# Patient Record
Sex: FEMALE | Race: WHITE | NOT HISPANIC OR LATINO | Employment: FULL TIME | URBAN - METROPOLITAN AREA
[De-identification: names, ages, dates, MRNs, and addresses within clinical notes are randomized per-mention and may not be internally consistent; named-entity substitution may affect disease eponyms.]

---

## 2018-06-07 ENCOUNTER — HOSPITAL ENCOUNTER (EMERGENCY)
Facility: HOSPITAL | Age: 38
Discharge: HOME | End: 2018-06-07
Payer: COMMERCIAL

## 2018-06-07 VITALS
SYSTOLIC BLOOD PRESSURE: 126 MMHG | RESPIRATION RATE: 18 BRPM | OXYGEN SATURATION: 100 % | HEART RATE: 68 BPM | WEIGHT: 185.63 LBS | TEMPERATURE: 98 F | DIASTOLIC BLOOD PRESSURE: 78 MMHG

## 2018-06-07 DIAGNOSIS — N39.0 URINARY TRACT INFECTION WITHOUT HEMATURIA, SITE UNSPECIFIED: Primary | ICD-10-CM

## 2018-06-07 DIAGNOSIS — R10.9 BILATERAL FLANK PAIN: ICD-10-CM

## 2018-06-07 LAB
ALBUMIN SERPL BCP-MCNC: 3.9 G/DL
ALP SERPL-CCNC: 66 U/L
ALT SERPL W/O P-5'-P-CCNC: 36 U/L
ANION GAP SERPL CALC-SCNC: 9 MMOL/L
AST SERPL-CCNC: 25 U/L
B-HCG UR QL: NEGATIVE
BACTERIA #/AREA URNS HPF: ABNORMAL /HPF
BASOPHILS # BLD AUTO: 0.03 K/UL
BASOPHILS NFR BLD: 0.3 %
BILIRUB SERPL-MCNC: 0.5 MG/DL
BILIRUB UR QL STRIP: ABNORMAL
BUN SERPL-MCNC: 16 MG/DL
CALCIUM SERPL-MCNC: 10.3 MG/DL
CHLORIDE SERPL-SCNC: 110 MMOL/L
CLARITY UR: ABNORMAL
CO2 SERPL-SCNC: 23 MMOL/L
COLOR UR: ABNORMAL
CREAT SERPL-MCNC: 1 MG/DL
DIFFERENTIAL METHOD: ABNORMAL
EOSINOPHIL # BLD AUTO: 0.3 K/UL
EOSINOPHIL NFR BLD: 3.1 %
ERYTHROCYTE [DISTWIDTH] IN BLOOD BY AUTOMATED COUNT: 13.3 %
EST. GFR  (AFRICAN AMERICAN): >60 ML/MIN/1.73 M^2
EST. GFR  (NON AFRICAN AMERICAN): >60 ML/MIN/1.73 M^2
GLUCOSE SERPL-MCNC: 93 MG/DL
GLUCOSE UR QL STRIP: ABNORMAL
HCT VFR BLD AUTO: 42.7 %
HGB BLD-MCNC: 14.6 G/DL
HGB UR QL STRIP: ABNORMAL
KETONES UR QL STRIP: ABNORMAL
LEUKOCYTE ESTERASE UR QL STRIP: ABNORMAL
LYMPHOCYTES # BLD AUTO: 1.8 K/UL
LYMPHOCYTES NFR BLD: 20.4 %
MCH RBC QN AUTO: 31.5 PG
MCHC RBC AUTO-ENTMCNC: 34.2 G/DL
MCV RBC AUTO: 92 FL
MICROSCOPIC COMMENT: ABNORMAL
MONOCYTES # BLD AUTO: 1 K/UL
MONOCYTES NFR BLD: 10.9 %
NEUTROPHILS # BLD AUTO: 5.7 K/UL
NEUTROPHILS NFR BLD: 65.3 %
NITRITE UR QL STRIP: ABNORMAL
PH UR STRIP: ABNORMAL [PH] (ref 5–8)
PLATELET # BLD AUTO: 302 K/UL
PMV BLD AUTO: 9.7 FL
POTASSIUM SERPL-SCNC: 4 MMOL/L
PROT SERPL-MCNC: 7.4 G/DL
PROT UR QL STRIP: ABNORMAL
RBC # BLD AUTO: 4.63 M/UL
SODIUM SERPL-SCNC: 142 MMOL/L
SP GR UR STRIP: ABNORMAL (ref 1–1.03)
SQUAMOUS #/AREA URNS HPF: 20 /HPF
URN SPEC COLLECT METH UR: ABNORMAL
UROBILINOGEN UR STRIP-ACNC: ABNORMAL EU/DL
WBC # BLD AUTO: 8.72 K/UL
WBC #/AREA URNS HPF: 10 /HPF (ref 0–5)
YEAST URNS QL MICRO: ABNORMAL

## 2018-06-07 PROCEDURE — 85025 COMPLETE CBC W/AUTO DIFF WBC: CPT

## 2018-06-07 PROCEDURE — 99284 EMERGENCY DEPT VISIT MOD MDM: CPT | Mod: 25

## 2018-06-07 PROCEDURE — 80053 COMPREHEN METABOLIC PANEL: CPT

## 2018-06-07 PROCEDURE — 96374 THER/PROPH/DIAG INJ IV PUSH: CPT

## 2018-06-07 PROCEDURE — 81025 URINE PREGNANCY TEST: CPT

## 2018-06-07 PROCEDURE — 81000 URINALYSIS NONAUTO W/SCOPE: CPT

## 2018-06-07 PROCEDURE — 63600175 PHARM REV CODE 636 W HCPCS: Performed by: PHYSICIAN ASSISTANT

## 2018-06-07 RX ORDER — PHENAZOPYRIDINE HYDROCHLORIDE 100 MG/1
100 TABLET, FILM COATED ORAL 3 TIMES DAILY PRN
COMMUNITY
End: 2020-01-01

## 2018-06-07 RX ORDER — EPINEPHRINE 0.15 MG/.3ML
0.15 INJECTION INTRAMUSCULAR
COMMUNITY

## 2018-06-07 RX ORDER — KETOROLAC TROMETHAMINE 30 MG/ML
10 INJECTION, SOLUTION INTRAMUSCULAR; INTRAVENOUS
Status: COMPLETED | OUTPATIENT
Start: 2018-06-07 | End: 2018-06-07

## 2018-06-07 RX ORDER — NITROFURANTOIN 25; 75 MG/1; MG/1
100 CAPSULE ORAL
COMMUNITY
End: 2020-01-01

## 2018-06-07 RX ORDER — MONTELUKAST SODIUM 10 MG/1
10 TABLET ORAL NIGHTLY
COMMUNITY

## 2018-06-07 RX ORDER — CITALOPRAM 40 MG/1
40 TABLET, FILM COATED ORAL DAILY
COMMUNITY

## 2018-06-07 RX ADMIN — KETOROLAC TROMETHAMINE 10 MG: 30 INJECTION, SOLUTION INTRAMUSCULAR; INTRAVENOUS at 07:06

## 2018-06-07 NOTE — ED PROVIDER NOTES
SCRIBE #1 NOTE: I, Nurys Alarcon, am scribing for, and in the presence of, RAY Bangura. I have scribed the entire note.      History      Chief Complaint   Patient presents with    Flank Pain     recently diagnosed with UTI, taking antibiotics; walk-in clinic sent her to ED for CT scan       Review of patient's allergies indicates:   Allergen Reactions    Wellbutrin [bupropion hcl] Hives        HPI   HPI    2018, 3:28 PM   History obtained from the patient      History of Present Illness: Antonia Elliott is a 38 y.o. female patient with PMHx of anxiety presents to the Emergency Department for flank pain which onset gradually yesterday. Symptoms are intermittent and moderate in severity.  Pt describes sxs as sharp/stabbing pain. Pain is located to bilateral flanks. Pt reports that her pain worsened today. Pt reports that she has been experiencing UTI sxs for the past week.  She states that she went to a walk in clinic yesterday and she is still waiting on culture results. Pt reports that she has been taking abx for UTI since her visit. Pt reports that she spoke to her doctor today and was referred to ED for CT. No mitigating or exacerbating factors reported. Associated sxs include frequency and dysuria. Patient denies any fever, nausea, vomiting, diarrhea, hematuria, vaginal discharge, pelvic pain, urinary urgency, decreased urine output, chills, SOB, and all other sxs at this time. No further complaints or concerns at this time.     Arrival mode: Personal vehicle    PCP: CYNTHIA Ruiz       Past Medical History:  Past Medical History:   Diagnosis Date    Anxiety     Lipoma     Miscarriage        Past Surgical History:  Past Surgical History:   Procedure Laterality Date    BLADDER SUSPENSION       SECTION      x2    LIPOMA RESECTION Left     Breast     PARTIAL HYSTERECTOMY           Family History:  No family history on file.    Social History:  Social History     Social  History Main Topics    Smoking status: Current Some Day Smoker    Smokeless tobacco: Never Used    Alcohol use No    Drug use: No    Sexual activity: Yes       ROS   Review of Systems   Constitutional: Negative for chills and fever.   HENT: Negative for sore throat.    Respiratory: Negative for shortness of breath.    Cardiovascular: Negative for chest pain.   Gastrointestinal: Negative for abdominal pain, diarrhea, nausea and vomiting.   Genitourinary: Positive for dysuria, flank pain (bilateral) and frequency. Negative for decreased urine volume, difficulty urinating, hematuria, pelvic pain, urgency and vaginal discharge.   Musculoskeletal: Negative for back pain.   Skin: Negative for rash.   Neurological: Negative for weakness.   Hematological: Does not bruise/bleed easily.       Physical Exam      Initial Vitals [06/07/18 1514]   BP Pulse Resp Temp SpO2   (!) 159/84 81 18 98.3 °F (36.8 °C) (!) 94 %      MAP       109          Physical Exam  Nursing Notes and Vital Signs Reviewed.  Constitutional: Patient is in no apparent distress. Well-developed and well-nourished.  Head: Atraumatic. Normocephalic.  Eyes: PERRL. EOM intact. Conjunctivae are not pale. No scleral icterus.  ENT: Mucous membranes are moist. Oropharynx is clear and symmetric.    Neck: Supple. Full ROM. No lymphadenopathy.  Cardiovascular: Regular rate. Regular rhythm. No murmurs, rubs, or gallops. Distal pulses are 2+ and symmetric.  Pulmonary/Chest: No respiratory distress. Clear to auscultation bilaterally. No wheezing or rales.  Abdominal: Soft and non-distended.  There is no tenderness.  No rebound, guarding, or rigidity. Good bowel sounds.  Genitourinary: No CVA tenderness  Musculoskeletal: Moves all extremities. No obvious deformities. No edema. No calf tenderness.  Skin: Warm and dry.  Neurological:  Alert, awake, and appropriate.  Normal speech.  No acute focal neurological deficits are appreciated.  Psychiatric: Normal affect. Good eye  contact. Appropriate in content.    ED Course    Procedures  ED Vital Signs:  Vitals:    06/07/18 1514   BP: (!) 159/84   Pulse: 81   Resp: 18   Temp: 98.3 °F (36.8 °C)   TempSrc: Oral   SpO2: (!) 94%   Weight: 84.2 kg (185 lb 10 oz)       Abnormal Lab Results:  Labs Reviewed   URINALYSIS - Abnormal; Notable for the following:        Result Value    Color, UA Orange (*)     Appearance, UA Hazy (*)     All other components within normal limits   CBC W/ AUTO DIFFERENTIAL - Abnormal; Notable for the following:     MCH 31.5 (*)     All other components within normal limits   URINALYSIS MICROSCOPIC - Abnormal; Notable for the following:     WBC, UA 10 (*)     Bacteria, UA Moderate (*)     All other components within normal limits   PREGNANCY TEST, URINE RAPID   COMPREHENSIVE METABOLIC PANEL        All Lab Results:  Results for orders placed or performed during the hospital encounter of 06/07/18   Pregnancy, urine rapid   Result Value Ref Range    Preg Test, Ur Negative    Urinalysis   Result Value Ref Range    Specimen UA Urine, Clean Catch     Color, UA Orange (A) Yellow, Straw, Muna    Appearance, UA Hazy (A) Clear    pH, UA SEE COMMENT 5.0 - 8.0    Specific Gravity, UA SEE COMMENT 1.005 - 1.030    Protein, UA SEE COMMENT Negative    Glucose, UA SEE COMMENT Negative    Ketones, UA SEE COMMENT Negative    Bilirubin (UA) SEE COMMENT Negative    Occult Blood UA SEE COMMENT Negative    Nitrite, UA SEE COMMENT Negative    Urobilinogen, UA SEE COMMENT <2.0 EU/dL    Leukocytes, UA SEE COMMENT Negative   CBC auto differential   Result Value Ref Range    WBC 8.72 3.90 - 12.70 K/uL    RBC 4.63 4.00 - 5.40 M/uL    Hemoglobin 14.6 12.0 - 16.0 g/dL    Hematocrit 42.7 37.0 - 48.5 %    MCV 92 82 - 98 fL    MCH 31.5 (H) 27.0 - 31.0 pg    MCHC 34.2 32.0 - 36.0 g/dL    RDW 13.3 11.5 - 14.5 %    Platelets 302 150 - 350 K/uL    MPV 9.7 9.2 - 12.9 fL    Gran # (ANC) 5.7 1.8 - 7.7 K/uL    Lymph # 1.8 1.0 - 4.8 K/uL    Mono # 1.0 0.3 - 1.0  K/uL    Eos # 0.3 0.0 - 0.5 K/uL    Baso # 0.03 0.00 - 0.20 K/uL    Gran% 65.3 38.0 - 73.0 %    Lymph% 20.4 18.0 - 48.0 %    Mono% 10.9 4.0 - 15.0 %    Eosinophil% 3.1 0.0 - 8.0 %    Basophil% 0.3 0.0 - 1.9 %    Differential Method Automated    Comprehensive metabolic panel   Result Value Ref Range    Sodium 142 136 - 145 mmol/L    Potassium 4.0 3.5 - 5.1 mmol/L    Chloride 110 95 - 110 mmol/L    CO2 23 23 - 29 mmol/L    Glucose 93 70 - 110 mg/dL    BUN, Bld 16 6 - 20 mg/dL    Creatinine 1.0 0.5 - 1.4 mg/dL    Calcium 10.3 8.7 - 10.5 mg/dL    Total Protein 7.4 6.0 - 8.4 g/dL    Albumin 3.9 3.5 - 5.2 g/dL    Total Bilirubin 0.5 0.1 - 1.0 mg/dL    Alkaline Phosphatase 66 55 - 135 U/L    AST 25 10 - 40 U/L    ALT 36 10 - 44 U/L    Anion Gap 9 8 - 16 mmol/L    eGFR if African American >60 >60 mL/min/1.73 m^2    eGFR if non African American >60 >60 mL/min/1.73 m^2   Urinalysis Microscopic   Result Value Ref Range    WBC, UA 10 (H) 0 - 5 /hpf    Bacteria, UA Moderate (A) None-Occ /hpf    Yeast, UA None None    Squam Epithel, UA 20 /hpf    Microscopic Comment SEE COMMENT          Imaging Results:  Imaging Results          CT Renal Stone Study ABD Pelvis WO (Final result)  Result time 06/07/18 18:18:38    Final result by Taz Rincon MD (06/07/18 18:18:38)                 Impression:      No acute findings.    Asymmetrically enlarged right ovary.  Pelvic ultrasound recommended.    Left renal nephrolithiasis.    All CT scans at this facility use dose modulation, iterative reconstruction and/or weight based dosing when appropriate to reduce radiation dose to as low as reasonably achievable.      Electronically signed by: Taz Rincon MD  Date:    06/07/2018  Time:    18:18             Narrative:    EXAMINATION:  CT RENAL STONE STUDY ABD PELVIS WO    CLINICAL HISTORY:  Flank pain, stone disease suspected;    TECHNIQUE:  Axial images obtained of the abdomen and pelvis without IV contrast and without oral contrast.  Sagittal  and coronal reformats obtained.    COMPARISON:  None    FINDINGS:  ABDOMEN:    - Lung bases: Lungs bases are clear.    - Liver: No contour deformities.    - Gallbladder: No calcified gallstones.    - Bile Ducts: No evidence of intra or extra hepatic biliary ductal dilation.    - Spleen: No contour deformities.    - Kidneys: Punctate calculus lower pole left kidney.  Negative for hydronephrosis or ureteral calculus.    - Adrenals: Normal adrenals.    - Pancreas: No contour deformities.    - Bowel: The bowel is nonobstructed and without surrounding inflammatory changes.  Normal appendix.    - Retroperitoneum:  Unremarkable.    - Vascular: No abdominal aortic aneurysm.    - Abdominal wall:  Unremarkable.    PELVIS:    - Bladder: Normal.    - : Hysterectomy.  Asymmetrically enlarged right ovary.    BONES:  No acute osseous abnormality and no significant arthritic changes.                                        The Emergency Provider reviewed the vital signs and test results, which are outlined above.    ED Discussion       6:30 PM: Reassessed pt at this time. Pt is in no distress. Discussed with pt all pertinent ED information and results. Discussed pt diagnosis and plan of treatment. Gave pt all f/u and return to the ED instructions. All questions and concerns were addressed at this time. Pt expresses understanding of information and instructions, and is comfortable with plan to discharge. Pt is stable for discharge.    I discussed with patient and/or family/caretaker that evaluation in the ED does not suggest any emergent or life threatening medical conditions requiring immediate intervention beyond what was provided in the ED, and I believe patient is safe for discharge.  Regardless, an unremarkable evaluation in the ED does not preclude the development or presence of a serious of life threatening condition. As such, patient was instructed to return immediately for any worsening or change in current  symptoms.        ED Medication(s):  Medications   ketorolac injection 9.999 mg (not administered)       New Prescriptions    No medications on file       Follow-up Information     CYNTHIA Ruiz. Schedule an appointment as soon as possible for a visit in 2 days.    Specialty:  Family Medicine  Why:  Follow up with your primary care physician in the next 1-2 days for re-evaluation and further management. Continue medications as prescribed.  Contact information:  [address]  [address]  [facility]  [facility] [address]  [phone]             [facility] - .    Specialty:  Emergency Medicine  Why:  If symptoms worsen in any way.  Contact information:  [address]  [facility] [address] [phone]                   Medical Decision Making    Medical Decision Making:   History:   Old Medical Records: I decided to obtain old medical records.  Old Records Summarized: records from clinic visits.       <> Summary of Records: UA result from [facility] clinic visit yesterday:    POCT urinalysis dipstick (06/06/2018 9:15 AM)    Component Value Ref Range  Glucose, Urine POCT 100 (ONE HUNDRED) (A) NEGATIVE mg/dl  BILIRUBIN URINE,POC SMALL (A) NEGATIVE  KETONES URINE, POC TRACE (A) NEGATIVE mg/dl  SPECIFIC GRAVITY,POC 1.005 1.005 - 1.030  BLOOD URINE, POC SMALL (A) NEGATIVE  PH UR,POC 5.0 4.5 - 8.0  PROTEIN UR,  (ONE HUNDRED) (A) NEGATIVE mg/dl  UROBILINOGEN UR, POC 4.0 (FOUR) (A) 0.2 (TWO TENTHS), 1.0 (ONE) E.U/dl  NITRITE UR POC POSITIVE (A) NEGATIVE  LEUKOCYTE EST UR, POC LARGE (A) NEGATIVE  APPEARANCE FL,POC red, slightly cloudy    Lot Number ,[id number]    Expiration,POC 01/31/19    , POC siemens                    Scribe Attestation:   Scribe #1: I performed the above scribed service and the documentation accurately describes the services I performed. I attest to the accuracy of the note.    Attending:   Physician Attestation  Statement for Scribe #1: I, RAY Bangura, personally performed the services described in this documentation, as scribed by Nurys Alarcon, in my presence, and it is both accurate and complete.          Clinical Impression       ICD-10-CM ICD-9-CM   1. Urinary tract infection without hematuria, site unspecified N39.0 599.0   2. Bilateral flank pain R10.9 789.09       Disposition:   Disposition: Discharged  Condition: Stable           Piero Otero PA-C  06/07/18 1299

## 2020-01-01 ENCOUNTER — HOSPITAL ENCOUNTER (EMERGENCY)
Facility: HOSPITAL | Age: 40
Discharge: HOME | End: 2020-01-01
Attending: EMERGENCY MEDICINE
Payer: COMMERCIAL

## 2020-01-01 VITALS
TEMPERATURE: 98 F | RESPIRATION RATE: 20 BRPM | SYSTOLIC BLOOD PRESSURE: 122 MMHG | DIASTOLIC BLOOD PRESSURE: 78 MMHG | HEART RATE: 76 BPM | HEIGHT: 62 IN | WEIGHT: 187.19 LBS | OXYGEN SATURATION: 99 % | BODY MASS INDEX: 34.45 KG/M2

## 2020-01-01 DIAGNOSIS — R05.9 COUGH: ICD-10-CM

## 2020-01-01 DIAGNOSIS — H65.06 RECURRENT ACUTE SEROUS OTITIS MEDIA OF BOTH EARS: Primary | ICD-10-CM

## 2020-01-01 PROCEDURE — 96372 THER/PROPH/DIAG INJ SC/IM: CPT

## 2020-01-01 PROCEDURE — 99284 EMERGENCY DEPT VISIT MOD MDM: CPT | Mod: 25

## 2020-01-01 PROCEDURE — 63600175 PHARM REV CODE 636 W HCPCS: Performed by: EMERGENCY MEDICINE

## 2020-01-01 RX ORDER — DEXAMETHASONE SODIUM PHOSPHATE 4 MG/ML
8 INJECTION, SOLUTION INTRA-ARTICULAR; INTRALESIONAL; INTRAMUSCULAR; INTRAVENOUS; SOFT TISSUE
Status: COMPLETED | OUTPATIENT
Start: 2020-01-01 | End: 2020-01-01

## 2020-01-01 RX ADMIN — DEXAMETHASONE SODIUM PHOSPHATE 8 MG: 4 INJECTION, SOLUTION INTRA-ARTICULAR; INTRALESIONAL; INTRAMUSCULAR; INTRAVENOUS; SOFT TISSUE at 02:01

## 2020-01-01 NOTE — ED PROVIDER NOTES
SCRIBE #1 NOTE: I, Andrew Garcia, am scribing for, and in the presence of, Hernandez Hylton MD. I have scribed the entire note.      History      Chief Complaint   Patient presents with    Otalgia     seen at urgent care on Fri and was told she had fluid in her ears pt reports lightheaded     Cough     was dx with strep throat on Fri, now has unproductive cough       Review of patient's allergies indicates:   Allergen Reactions    Mold Anaphylaxis    Wasp venom Itching and Swelling    Wellbutrin [bupropion hcl] Hives        HPI   HPI    2020, 2:12 PM   History obtained from the patient      History of Present Illness: Antonia Elliott is a 39 y.o. female patient who presents to the Emergency Department for bilateral ear pain, onset 1 week PTA. Pt presented to the urgent care 5 days ago and was told she had fluid in her ears. Pt was also diagnosed with strep throat the same day, and placed on Amoxicillin. Pt tested negative for the flu. Symptoms are constant and moderate in severity. No mitigating or exacerbating factors reported. Associated sxs include cough and dizziness when bending forward. Patient denies any fever, chills, n/v/d, SOB, CP, weakness, numbness, and all other sxs at this time. No further complaints or concerns at this time.     Arrival mode: Personal vehicle    PCP: CYNTHIA Ruiz       Past Medical History:  Past Medical History:   Diagnosis Date    Anxiety     Lipoma     Miscarriage        Past Surgical History:  Past Surgical History:   Procedure Laterality Date    BLADDER SUSPENSION       SECTION      x2    LIPOMA RESECTION Left     Breast     PARTIAL HYSTERECTOMY           Family History:  No family history on file.    Social History:  Social History     Tobacco Use    Smoking status: Current Some Day Smoker    Smokeless tobacco: Never Used   Substance and Sexual Activity    Alcohol use: No    Drug use: No    Sexual activity: Yes       ROS   Review of Systems    Constitutional: Negative for chills, diaphoresis, fatigue and fever.   HENT: Positive for ear pain (bilateral). Negative for sore throat.    Respiratory: Positive for cough. Negative for shortness of breath.    Cardiovascular: Negative for chest pain.   Gastrointestinal: Negative for diarrhea, nausea and vomiting.   Genitourinary: Negative for dysuria.   Musculoskeletal: Negative for back pain.   Skin: Negative for rash and wound.   Neurological: Negative for dizziness, weakness, light-headedness, numbness and headaches.   Hematological: Does not bruise/bleed easily.   All other systems reviewed and are negative.    Physical Exam      Initial Vitals [01/01/20 1406]   BP Pulse Resp Temp SpO2   128/82 77 18 98.7 °F (37.1 °C) 97 %      MAP       --          Physical Exam  Nursing Notes and Vital Signs Reviewed.  Constitutional: Patient is in no acute distress. Well-developed and well-nourished.  Head: Atraumatic. Normocephalic.  Eyes: PERRL. EOM intact. Conjunctivae are not pale. No scleral icterus.  ENT: Mucous membranes are moist. Oropharynx is clear and symmetric. Nasal congestion. Bilateral TM bulging, no erythema.  Neck: Supple. Full ROM. No lymphadenopathy.  Cardiovascular: Regular rate. Regular rhythm. No murmurs, rubs, or gallops. Distal pulses are 2+ and symmetric.  Pulmonary/Chest: No respiratory distress. Clear to auscultation bilaterally. No wheezing or rales.  Abdominal: Soft and non-distended.  There is no tenderness.  No rebound, guarding, or rigidity.   Musculoskeletal: Moves all extremities. No obvious deformities. No edema.  Skin: Warm and dry.  Neurological:  Alert, awake, and appropriate.  Normal speech.  No acute focal neurological deficits are appreciated.  Psychiatric: Normal affect. Good eye contact. Appropriate in content.    ED Course    Procedures  ED Vital Signs:  Vitals:    01/01/20 1406   BP: 128/82   Pulse: 77   Resp: 18   Temp: 98.7 °F (37.1 °C)   TempSrc: Oral   SpO2: 97%   Weight:  "84.9 kg (187 lb 2.7 oz)   Height: 5' 2" (1.575 m)       Imaging Results:  Imaging Results          X-Ray Chest PA And Lateral (Final result)  Result time 01/01/20 14:32:03    Final result by Salvatore Finney MD (01/01/20 14:32:03)                 Impression:      No acute findings.      Electronically signed by: Salvatore Finney MD  Date:    01/01/2020  Time:    14:32             Narrative:    EXAMINATION:  XR CHEST PA AND LATERAL    CLINICAL HISTORY  Cough and congestion., Cough;    COMPARISON:  None    FINDINGS:  The heart size is normal.  The lung fields are clear.  No acute cardiopulmonary infiltrate.  Old right clavicle fracture.                                        The Emergency Provider reviewed the vital signs and test results, which are outlined above.    ED Discussion     2:37 PM: Reassessed pt at this time. Discussed with pt all pertinent ED information and results. Discussed pt dx and plan of tx. Gave pt all f/u and return to the ED instructions. All questions and concerns were addressed at this time. Pt expresses understanding of information and instructions, and is comfortable with plan to discharge. Pt is stable for discharge.    I discussed with patient and/or family/caretaker that evaluation in the ED does not suggest any emergent or life threatening medical conditions requiring immediate intervention beyond what was provided in the ED, and I believe patient is safe for discharge.  Regardless, an unremarkable evaluation in the ED does not preclude the development or presence of a serious of life threatening condition. As such, patient was instructed to return immediately for any worsening or change in current symptoms.    ED Medication(s):  Medications   dexamethasone injection 8 mg (has no administration in time range)       Follow-up Information     CYNTHIA Ruiz.    Specialty:  Family Medicine  Contact information:  [address]  [address]  [facility]  [facility] [address]  [phone]                  New Prescriptions    No medications on file         Medical Decision Making    Medical Decision Making:   Clinical Tests:   Radiological Study: Ordered and Reviewed           Scribe Attestation:   Scribe #1: I performed the above scribed service and the documentation accurately describes the services I performed. I attest to the accuracy of the note.    Attending:   Physician Attestation Statement for Scribe #1: I, Hernandez Hylton MD, personally performed the services described in this documentation, as scribed by Andrew Garcia, in my presence, and it is both accurate and complete.          Clinical Impression       ICD-10-CM ICD-9-CM   1. Recurrent acute serous otitis media of both ears H65.06 381.01   2. Cough R05 786.2       Disposition:   Disposition: Discharged  Condition: Stable         Hernandez Hylton MD  01/01/20 2780

## 2020-04-21 DIAGNOSIS — Z01.84 ANTIBODY RESPONSE EXAMINATION: ICD-10-CM

## 2020-04-23 ENCOUNTER — LAB VISIT (OUTPATIENT)
Dept: LAB | Facility: HOSPITAL | Age: 40
End: 2020-04-23
Attending: INTERNAL MEDICINE
Payer: COMMERCIAL

## 2020-04-23 DIAGNOSIS — Z01.84 ANTIBODY RESPONSE EXAMINATION: ICD-10-CM

## 2020-04-23 LAB — SARS-COV-2 IGG SERPL QL IA: NEGATIVE

## 2020-04-23 PROCEDURE — 36415 COLL VENOUS BLD VENIPUNCTURE: CPT

## 2020-04-23 PROCEDURE — 86769 SARS-COV-2 COVID-19 ANTIBODY: CPT

## 2020-08-03 ENCOUNTER — OFFICE VISIT (OUTPATIENT)
Dept: INTERNAL MEDICINE | Facility: CLINIC | Age: 40
End: 2020-08-03
Payer: COMMERCIAL

## 2020-08-03 ENCOUNTER — LAB VISIT (OUTPATIENT)
Dept: INTERNAL MEDICINE | Facility: CLINIC | Age: 40
End: 2020-08-03
Payer: COMMERCIAL

## 2020-08-03 VITALS
HEART RATE: 65 BPM | SYSTOLIC BLOOD PRESSURE: 118 MMHG | BODY MASS INDEX: 34.12 KG/M2 | DIASTOLIC BLOOD PRESSURE: 76 MMHG | TEMPERATURE: 97 F | WEIGHT: 185.44 LBS | OXYGEN SATURATION: 99 % | HEIGHT: 62 IN

## 2020-08-03 DIAGNOSIS — R68.83 CHILLS: Primary | ICD-10-CM

## 2020-08-03 DIAGNOSIS — R09.81 SINUS CONGESTION: ICD-10-CM

## 2020-08-03 DIAGNOSIS — R68.83 CHILLS: ICD-10-CM

## 2020-08-03 PROCEDURE — 99214 PR OFFICE/OUTPT VISIT, EST, LEVL IV, 30-39 MIN: ICD-10-PCS | Mod: S$GLB,,, | Performed by: FAMILY MEDICINE

## 2020-08-03 PROCEDURE — 99999 PR PBB SHADOW E&M-EST. PATIENT-LVL III: ICD-10-PCS | Mod: PBBFAC,,, | Performed by: FAMILY MEDICINE

## 2020-08-03 PROCEDURE — 99999 PR PBB SHADOW E&M-EST. PATIENT-LVL III: CPT | Mod: PBBFAC,,, | Performed by: FAMILY MEDICINE

## 2020-08-03 PROCEDURE — 3008F PR BODY MASS INDEX (BMI) DOCUMENTED: ICD-10-PCS | Mod: CPTII,S$GLB,, | Performed by: FAMILY MEDICINE

## 2020-08-03 PROCEDURE — U0003 INFECTIOUS AGENT DETECTION BY NUCLEIC ACID (DNA OR RNA); SEVERE ACUTE RESPIRATORY SYNDROME CORONAVIRUS 2 (SARS-COV-2) (CORONAVIRUS DISEASE [COVID-19]), AMPLIFIED PROBE TECHNIQUE, MAKING USE OF HIGH THROUGHPUT TECHNOLOGIES AS DESCRIBED BY CMS-2020-01-R: HCPCS

## 2020-08-03 PROCEDURE — 99214 OFFICE O/P EST MOD 30 MIN: CPT | Mod: S$GLB,,, | Performed by: FAMILY MEDICINE

## 2020-08-03 PROCEDURE — 99213 OFFICE O/P EST LOW 20 MIN: CPT | Mod: PBBFAC | Performed by: FAMILY MEDICINE

## 2020-08-03 PROCEDURE — 3008F BODY MASS INDEX DOCD: CPT | Mod: CPTII,S$GLB,, | Performed by: FAMILY MEDICINE

## 2020-08-03 RX ORDER — METHYLPREDNISOLONE 4 MG/1
TABLET ORAL
Qty: 1 PACKAGE | Refills: 0 | Status: SHIPPED | OUTPATIENT
Start: 2020-08-03

## 2020-08-03 NOTE — PROGRESS NOTES
tSubjective:       Patient ID: Antonia Elliott is a 40 y.o. female.    Chief Complaint: Chills, Headache, and Sinus Problem    Pt is a 40 year old employee who is complaining of smell problems, sinus congestion and some post nasal. Body aches and chills. Pt reports that symptoms just started. She does suffer from sinus allergies.    Review of Systems   Constitutional: Positive for chills. Negative for fever.   HENT: Positive for postnasal drip, sinus pressure/congestion and sore throat.    Respiratory: Negative.  Negative for cough and shortness of breath.    Cardiovascular: Negative.    Neurological: Negative.          Objective:      Physical Exam  Constitutional:       Appearance: Normal appearance.   HENT:      Right Ear: A middle ear effusion is present. Tympanic membrane is not erythematous.      Left Ear: A middle ear effusion is present. Tympanic membrane is not erythematous.      Nose: Mucosal edema present.      Right Sinus: No maxillary sinus tenderness or frontal sinus tenderness.      Left Sinus: No frontal sinus tenderness.      Mouth/Throat:      Pharynx: Posterior oropharyngeal erythema present.   Cardiovascular:      Rate and Rhythm: Normal rate and regular rhythm.      Heart sounds: No murmur. No friction rub.   Neurological:      Mental Status: She is alert.         Assessment:       1. Chills    2. Sinus congestion        Plan:       Chills  Comments:  Will do covid  Orders:  -     COVID-19 Routine Screening; Future; Expected date: 08/03/2020    Sinus congestion  Comments:  Start on Medrol dose pack    Other orders  -     methylPREDNISolone (MEDROL DOSEPACK) 4 mg tablet; use as directed  Dispense: 1 Package; Refill: 0

## 2020-08-05 LAB — SARS-COV-2 RNA RESP QL NAA+PROBE: NOT DETECTED

## 2021-05-12 ENCOUNTER — PATIENT MESSAGE (OUTPATIENT)
Dept: RESEARCH | Facility: HOSPITAL | Age: 41
End: 2021-05-12